# Patient Record
Sex: FEMALE | Race: WHITE
[De-identification: names, ages, dates, MRNs, and addresses within clinical notes are randomized per-mention and may not be internally consistent; named-entity substitution may affect disease eponyms.]

---

## 2020-12-22 ENCOUNTER — HOSPITAL ENCOUNTER (OUTPATIENT)
Dept: HOSPITAL 76 - COV | Age: 45
Discharge: HOME | End: 2020-12-22
Attending: FAMILY MEDICINE
Payer: MEDICAID

## 2020-12-22 DIAGNOSIS — J34.89: ICD-10-CM

## 2020-12-22 DIAGNOSIS — Z20.828: ICD-10-CM

## 2020-12-22 DIAGNOSIS — R07.0: ICD-10-CM

## 2020-12-22 DIAGNOSIS — R09.81: ICD-10-CM

## 2020-12-22 DIAGNOSIS — R53.83: ICD-10-CM

## 2020-12-22 DIAGNOSIS — R05: Primary | ICD-10-CM

## 2022-03-15 ENCOUNTER — HOSPITAL ENCOUNTER (EMERGENCY)
Dept: HOSPITAL 76 - ED | Age: 47
Discharge: HOME | End: 2022-03-15
Payer: MEDICAID

## 2022-03-15 VITALS — DIASTOLIC BLOOD PRESSURE: 61 MMHG | SYSTOLIC BLOOD PRESSURE: 108 MMHG

## 2022-03-15 DIAGNOSIS — K57.92: Primary | ICD-10-CM

## 2022-03-15 LAB
ALBUMIN DIAFP-MCNC: 3.8 G/DL (ref 3.2–5.5)
ALBUMIN/GLOB SERPL: 1 {RATIO} (ref 1–2.2)
ALP SERPL-CCNC: 42 IU/L (ref 42–121)
ALT SERPL W P-5'-P-CCNC: 13 IU/L (ref 10–60)
ANION GAP SERPL CALCULATED.4IONS-SCNC: 9 MMOL/L (ref 6–13)
AST SERPL W P-5'-P-CCNC: 15 IU/L (ref 10–42)
BASOPHILS NFR BLD AUTO: 0.1 10^3/UL (ref 0–0.1)
BASOPHILS NFR BLD AUTO: 0.5 %
BILIRUB BLD-MCNC: 0.5 MG/DL (ref 0.2–1)
BUN SERPL-MCNC: 12 MG/DL (ref 6–20)
CALCIUM UR-MCNC: 9 MG/DL (ref 8.5–10.3)
CHLORIDE SERPL-SCNC: 97 MMOL/L (ref 101–111)
CO2 SERPL-SCNC: 27 MMOL/L (ref 21–32)
CREAT SERPLBLD-SCNC: 0.9 MG/DL (ref 0.4–1)
EOSINOPHIL # BLD AUTO: 0 10^3/UL (ref 0–0.7)
EOSINOPHIL NFR BLD AUTO: 0.1 %
ERYTHROCYTE [DISTWIDTH] IN BLOOD BY AUTOMATED COUNT: 11.8 % (ref 12–15)
GFRSERPLBLD MDRD-ARVRAT: 67 ML/MIN/{1.73_M2} (ref 89–?)
GLOBULIN SER-MCNC: 3.7 G/DL (ref 2.1–4.2)
GLUCOSE SERPL-MCNC: 92 MG/DL (ref 70–100)
HCT VFR BLD AUTO: 41.7 % (ref 37–47)
HGB UR QL STRIP: 13.8 G/DL (ref 12–16)
LIPASE SERPL-CCNC: 31 U/L (ref 22–51)
LYMPHOCYTES # SPEC AUTO: 1.1 10^3/UL (ref 1.5–3.5)
LYMPHOCYTES NFR BLD AUTO: 11 %
MCH RBC QN AUTO: 31.5 PG (ref 27–31)
MCHC RBC AUTO-ENTMCNC: 33.1 G/DL (ref 32–36)
MCV RBC AUTO: 95.2 FL (ref 81–99)
MONOCYTES # BLD AUTO: 0.5 10^3/UL (ref 0–1)
MONOCYTES NFR BLD AUTO: 5.4 %
NEUTROPHILS # BLD AUTO: 8.1 10^3/UL (ref 1.5–6.6)
NEUTROPHILS # SNV AUTO: 9.8 X10^3/UL (ref 4.8–10.8)
NEUTROPHILS NFR BLD AUTO: 82.8 %
NRBC # BLD AUTO: 0 /100WBC
NRBC # BLD AUTO: 0 X10^3/UL
PDW BLD AUTO: 9.8 FL (ref 7.9–10.8)
PLATELET # BLD: 283 10^3/UL (ref 130–450)
POTASSIUM SERPL-SCNC: 3.7 MMOL/L (ref 3.5–5)
PROT SPEC-MCNC: 7.5 G/DL (ref 6.7–8.2)
RBC MAR: 4.38 10^6/UL (ref 4.2–5.4)
SODIUM SERPLBLD-SCNC: 133 MMOL/L (ref 135–145)

## 2022-03-15 PROCEDURE — 83690 ASSAY OF LIPASE: CPT

## 2022-03-15 PROCEDURE — 99283 EMERGENCY DEPT VISIT LOW MDM: CPT

## 2022-03-15 PROCEDURE — 80053 COMPREHEN METABOLIC PANEL: CPT

## 2022-03-15 PROCEDURE — 36415 COLL VENOUS BLD VENIPUNCTURE: CPT

## 2022-03-15 PROCEDURE — 85025 COMPLETE CBC W/AUTO DIFF WBC: CPT

## 2022-03-15 NOTE — ED PHYSICIAN DOCUMENTATION
PD HPI ABD PAIN





- Stated complaint


Stated Complaint: ABD PX





- Chief complaint


Chief Complaint: Abd Pain





- History obtained from


History obtained from: Patient





- Additional information


Additional information: 





46-year-old woman with history of recurrent diverticulitis.  Current flare 

started 6 days ago and has not responded to herbal medication and low residue 

diet.  She had a fever on the first day but not since.  Has never had a 

colonoscopy.  No abdominal surgeries in the past.





Review of Systems


Constitutional: reports: Fever.  denies: Chills


Cardiac: denies: Chest pain / pressure, Palpitations


Respiratory: denies: Dyspnea, Cough





PD PAST MEDICAL HISTORY





- Present Medications


Home Medications: 


                                Ambulatory Orders











 Medication  Instructions  Recorded  Confirmed


 


Amox/Clav 875/125 [Augmentin] 1 each PO TID #30 tablet 03/15/22 














- Allergies


Allergies/Adverse Reactions: 


                                    Allergies











Allergy/AdvReac Type Severity Reaction Status Date / Time


 


No Known Drug Allergies Allergy   Verified 03/15/22 11:19














PD ED PE NORMAL





- Vitals


Vital signs reviewed: Yes





- General


General: Alert and oriented X 3, No acute distress





- Respiratory


Respiratory: No respiratory distress





- Abdomen


Abdomen: Normal bowel sounds, Soft, Other (Very mild tenderness in the left 

lower quadrant without surgical signs)





- Back


Back: No CVA TTP, No spinal TTP





- Derm


Derm: Normal color, Warm and dry





- Extremities


Extremities: No edema, No calf tenderness / cord





- Neuro


Neuro: Alert and oriented X 3, Normal speech





Results





- Vitals


Vitals: 





                               Vital Signs - 24 hr











  03/15/22





  11:15


 


Temperature 36.1 C L


 


Heart Rate 95


 


Respiratory 16





Rate 


 


Blood Pressure 108/61


 


O2 Saturation 99








                                     Oxygen











O2 Source                      Room air

















- Labs


Labs: 





                                Laboratory Tests











  03/15/22 03/15/22





  11:29 11:29


 


WBC  9.8 


 


RBC  4.38 


 


Hgb  13.8 


 


Hct  41.7 


 


MCV  95.2 


 


MCH  31.5 H 


 


MCHC  33.1 


 


RDW  11.8 L 


 


Plt Count  283 


 


MPV  9.8 


 


Neut # (Auto)  8.1 H 


 


Lymph # (Auto)  1.1 L 


 


Mono # (Auto)  0.5 


 


Eos # (Auto)  0.0 


 


Baso # (Auto)  0.1 


 


Absolute Nucleated RBC  0.00 


 


Nucleated RBC %  0.0 


 


Sodium   133 L


 


Potassium   3.7


 


Chloride   97 L


 


Carbon Dioxide   27


 


Anion Gap   9.0


 


BUN   12


 


Creatinine   0.9


 


Estimated GFR (MDRD)   67 L


 


Glucose   92


 


Calcium   9.0


 


Total Bilirubin   0.5


 


AST   15


 


ALT   13


 


Alkaline Phosphatase   42


 


Total Protein   7.5


 


Albumin   3.8


 


Globulin   3.7


 


Albumin/Globulin Ratio   1.0


 


Lipase   31














PD MEDICAL DECISION MAKING





- ED course


ED course: 





46-year-old woman with what seems like a recurrent flare of diverticulitis.  She

 declined CT scanning which I think is probably fine.  Given that she is on day 

6 and worsening despite appropriate dietary changes will start antibiotics.





Departure





- Departure


Disposition: 01 Home, Self Care


Clinical Impression: 


 Diverticulitis of gastrointestinal tract





Condition: Good


Record reviewed to determine appropriate education?: Yes


Instructions:  ED Diverticulitis


Prescriptions: 


Amox/Clav 875/125 [Augmentin] 1 each PO TID #30 tablet


Comments: 


As discussed, you really should have a colonoscopy at some point, but given the 

current likely flare of diverticulitis should wait until a couple of months have

 passed without a flare.





I sent your prescription electronically to [facility] in [place].





Return for new or worsening symptoms.